# Patient Record
Sex: FEMALE | URBAN - METROPOLITAN AREA
[De-identification: names, ages, dates, MRNs, and addresses within clinical notes are randomized per-mention and may not be internally consistent; named-entity substitution may affect disease eponyms.]

---

## 2017-02-16 ENCOUNTER — AMBULATORY - HEALTHEAST (OUTPATIENT)
Dept: NEUROLOGY | Facility: CLINIC | Age: 52
End: 2017-02-16

## 2017-02-16 DIAGNOSIS — I67.1 BRAIN ANEURYSM: ICD-10-CM

## 2017-04-12 ENCOUNTER — HOSPITAL ENCOUNTER (OUTPATIENT)
Dept: NEUROLOGY | Facility: CLINIC | Age: 52
Setting detail: THERAPIES SERIES
Discharge: STILL ADMITTED OR ADMINISTRATIVE | End: 2017-04-12
Attending: SOCIAL WORKER

## 2017-04-12 ENCOUNTER — HOSPITAL ENCOUNTER (OUTPATIENT)
Dept: NEUROLOGY | Facility: CLINIC | Age: 52
Setting detail: THERAPIES SERIES
Discharge: STILL ADMITTED OR ADMINISTRATIVE | End: 2017-04-12
Attending: NURSE PRACTITIONER

## 2017-04-12 DIAGNOSIS — F09 COGNITIVE DISORDER: ICD-10-CM

## 2017-04-12 ASSESSMENT — MIFFLIN-ST. JEOR: SCORE: 1171.48

## 2017-12-03 ENCOUNTER — HEALTH MAINTENANCE LETTER (OUTPATIENT)
Age: 52
End: 2017-12-03

## 2018-02-05 ENCOUNTER — RECORDS - HEALTHEAST (OUTPATIENT)
Dept: LAB | Facility: CLINIC | Age: 53
End: 2018-02-05

## 2018-02-05 LAB — VIT B12 SERPL-MCNC: 421 PG/ML (ref 213–816)

## 2018-06-04 ENCOUNTER — RECORDS - HEALTHEAST (OUTPATIENT)
Dept: LAB | Facility: CLINIC | Age: 53
End: 2018-06-04

## 2018-06-05 LAB
ANION GAP SERPL CALCULATED.3IONS-SCNC: 10 MMOL/L (ref 5–18)
BASOPHILS # BLD AUTO: 0 THOU/UL (ref 0–0.2)
BASOPHILS NFR BLD AUTO: 0 % (ref 0–2)
BUN SERPL-MCNC: 13 MG/DL (ref 8–22)
CALCIUM SERPL-MCNC: 9.4 MG/DL (ref 8.5–10.5)
CHLORIDE BLD-SCNC: 105 MMOL/L (ref 98–107)
CO2 SERPL-SCNC: 24 MMOL/L (ref 22–31)
CREAT SERPL-MCNC: 0.77 MG/DL (ref 0.6–1.1)
EOSINOPHIL # BLD AUTO: 0.1 THOU/UL (ref 0–0.4)
EOSINOPHIL NFR BLD AUTO: 2 % (ref 0–6)
ERYTHROCYTE [DISTWIDTH] IN BLOOD BY AUTOMATED COUNT: 14.5 % (ref 11–14.5)
GFR SERPL CREATININE-BSD FRML MDRD: >60 ML/MIN/1.73M2
GLUCOSE BLD-MCNC: 96 MG/DL (ref 70–125)
HCT VFR BLD AUTO: 39 % (ref 35–47)
HGB BLD-MCNC: 12.9 G/DL (ref 12–16)
LYMPHOCYTES # BLD AUTO: 1.2 THOU/UL (ref 0.8–4.4)
LYMPHOCYTES NFR BLD AUTO: 26 % (ref 20–40)
MCH RBC QN AUTO: 29.9 PG (ref 27–34)
MCHC RBC AUTO-ENTMCNC: 33.1 G/DL (ref 32–36)
MCV RBC AUTO: 91 FL (ref 80–100)
MONOCYTES # BLD AUTO: 0.5 THOU/UL (ref 0–0.9)
MONOCYTES NFR BLD AUTO: 11 % (ref 2–10)
NEUTROPHILS # BLD AUTO: 2.7 THOU/UL (ref 2–7.7)
NEUTROPHILS NFR BLD AUTO: 60 % (ref 50–70)
PLATELET # BLD AUTO: 196 THOU/UL (ref 140–440)
PMV BLD AUTO: 10.4 FL (ref 8.5–12.5)
POTASSIUM BLD-SCNC: 4.3 MMOL/L (ref 3.5–5)
RBC # BLD AUTO: 4.31 MILL/UL (ref 3.8–5.4)
SODIUM SERPL-SCNC: 139 MMOL/L (ref 136–145)
WBC: 4.5 THOU/UL (ref 4–11)

## 2018-07-12 ENCOUNTER — TRANSFERRED RECORDS (OUTPATIENT)
Dept: HEALTH INFORMATION MANAGEMENT | Facility: CLINIC | Age: 53
End: 2018-07-12

## 2018-07-18 ENCOUNTER — OFFICE VISIT (OUTPATIENT)
Dept: OPHTHALMOLOGY | Facility: CLINIC | Age: 53
End: 2018-07-18
Payer: COMMERCIAL

## 2018-07-18 DIAGNOSIS — I60.7 CEREBRAL ANEURYSM RUPTURE (H): ICD-10-CM

## 2018-07-18 DIAGNOSIS — H53.10 SUBJECTIVE VISUAL DISTURBANCE: Primary | ICD-10-CM

## 2018-07-18 DIAGNOSIS — H52.4 PRESBYOPIA: ICD-10-CM

## 2018-07-18 RX ORDER — ASPIRIN 81 MG/1
81 TABLET, CHEWABLE ORAL
COMMUNITY
Start: 2018-01-25

## 2018-07-18 RX ORDER — QUETIAPINE FUMARATE 25 MG/1
50 TABLET, FILM COATED ORAL
COMMUNITY
Start: 2018-01-25

## 2018-07-18 RX ORDER — CYCLOBENZAPRINE HCL 5 MG
5 TABLET ORAL
COMMUNITY
Start: 2018-01-25

## 2018-07-18 RX ORDER — TAMOXIFEN CITRATE 20 MG/1
20 TABLET ORAL
COMMUNITY
Start: 2018-01-25

## 2018-07-18 RX ORDER — VENLAFAXINE 75 MG/1
75 TABLET ORAL
COMMUNITY
Start: 2018-01-25

## 2018-07-18 RX ORDER — VENLAFAXINE HYDROCHLORIDE 150 MG/1
150 CAPSULE, EXTENDED RELEASE ORAL
COMMUNITY
Start: 2018-01-25

## 2018-07-18 RX ORDER — ONDANSETRON 4 MG/1
4 TABLET, FILM COATED ORAL
COMMUNITY
Start: 2018-01-25

## 2018-07-18 RX ORDER — HYDROXYZINE HYDROCHLORIDE 25 MG/1
100 TABLET, FILM COATED ORAL
COMMUNITY
Start: 2018-01-25

## 2018-07-18 ASSESSMENT — EXTERNAL EXAM - RIGHT EYE: OD_EXAM: NORMAL

## 2018-07-18 ASSESSMENT — CONF VISUAL FIELD
OD_NORMAL: 1
METHOD: COUNTING FINGERS
OS_NORMAL: 1

## 2018-07-18 ASSESSMENT — CUP TO DISC RATIO
OS_RATIO: 0.3
OD_RATIO: 0.3

## 2018-07-18 ASSESSMENT — TONOMETRY
OD_IOP_MMHG: 12
OS_IOP_MMHG: 12
IOP_METHOD: ICARE

## 2018-07-18 ASSESSMENT — SLIT LAMP EXAM - LIDS
COMMENTS: NORMAL
COMMENTS: NORMAL

## 2018-07-18 ASSESSMENT — VISUAL ACUITY
OS_SC: 20/20
OS_SC+: -2
OD_SC: 20/20
METHOD: SNELLEN - LINEAR

## 2018-07-18 ASSESSMENT — EXTERNAL EXAM - LEFT EYE: OS_EXAM: NORMAL

## 2018-07-18 NOTE — PROGRESS NOTES
Assessment/Plan  (H53.10) Subjective visual disturbance  (primary encounter diagnosis)  Comment: Some difficulty with eye tracking, processing delay between eyes and brain  Plan: Discussed findings with patient. Ocular health today appears WNL. Symptoms appear to be consistent with cognitive and/or vestibular impairment. Encouraged patient to continue working with PT on gaze-stability training. Increasing difficulty in these exercises should continue improving patient's ability to function in busy environments. Advised patient to RTC with any changes in her vision- including new blur, double vision, or difficulty with focusing.    (I60.9) Cerebral aneurysm rupture (H)  Plan: Continue following recommendations and follow up schedule of PCP.     (H52.4) Presbyopia  Plan: Continue with OTC reading glasses. No prescription glasses warranted at this time.       Complete documentation of historical and exam elements from today's encounter can  be found in the full encounter summary report (not reduplicated in this progress  note). I personally obtained the chief complaint(s) and history of present illness. I  confirmed and edited as necessary the review of systems, past medical/surgical  history, family history, social history, and examination findings as documented by  others; and I examined the patient myself. I personally reviewed the relevant tests,  images, and reports as documented above. I formulated and edited as necessary the  assessment and plan and discussed the findings and management plan with the  patient and family.    Jeff Ruiz OD

## 2018-07-18 NOTE — MR AVS SNAPSHOT
After Visit Summary   7/18/2018    Zeynep Martin    MRN: [id number]           Patient Information     Date Of Birth          1965        Visit Information        Provider Department      7/18/2018 12:00 PM Jeff Ruiz, [facility]        Today's Diagnoses     Subjective visual disturbance    -  1    Cerebral aneurysm rupture (H)        Presbyopia           Follow-ups after your visit        Follow-up notes from your care team     Return if symptoms worsen or fail to improve, for Follow Up.      Who to contact     Please call your clinic at [phone] to:    Ask questions about your health    Make or cancel appointments    Discuss your medicines    Learn about your test results    Speak to your doctor            Additional Information About Your Visit        [employer] Information     [employer] gives you secure access to your electronic health record. If you see a primary care provider, you can also send messages to your care team and make appointments. If you have questions, please call your primary care clinic.  If you do not have a primary care provider, please call [phone] and they will assist you.      [employer] is an electronic gateway that provides easy, online access to your medical records. With [employer], you can request a clinic appointment, read your test results, renew a prescription or communicate with your care team.     To access your existing account, please contact your [facility] or call [phone] for assistance.        Care EveryWhere ID     This is your Care EveryWhere ID. This could be used by other organizations to access your [place] medical records  [id number]         Blood Pressure from Last 3 Encounters:   04/08/15 99/64   07/25/13 99/50    Weight from Last 3 Encounters:   04/08/15 55.3 kg (121 lb 14.4 oz)   07/25/13 55.6 kg (122 lb 8 oz)              Today, you had the following     No orders found for display        Primary Care Provider Office Phone # Fax #    Kacy Cruz PA-C [phone]       [facility] [address] [place] [address]        Equal Access to Services     RAFITA WALKER : Hadii aad ku hadshailesho Soernestoali, waaxda luqadaha, qaybta kaalmada adeegyada, haja sorto ravi rosen. So [facility] [phone].    ATENCIÓN: Si habla español, tiene a fontana disposición servicios gratuitos de asistencia lingüística. Llame al [phone].    We comply with applicable federal civil rights laws and [place] laws. We do not discriminate on the basis of race, color, national origin, age, disability, sex, sexual orientation, or gender identity.            Thank you!     Thank you for choosing [facility]  for your care. Our goal is always to provide you with excellent care. Hearing back from our patients is one way we can continue to improve our services. Please take a few minutes to complete the written survey that you may receive in the mail after your visit with us. Thank you!             Your Updated Medication List - Protect others around you: Learn how to safely use, store and throw away your medicines at www.disposemymeds.org.          This list is accurate as of 7/18/18  1:32 PM.  Always use your most recent med list.                   Brand Name Dispense Instructions for use Diagnosis    acetaminophen 500 MG tablet    TYLENOL     Take 500-1,000 mg by mouth every 6 hours as needed. Take as needed        aspirin 81 MG chewable tablet      Take 81 mg by mouth        Biotin 5000 MCG Caps      Take by mouth daily        celeXA 20 MG tablet   Generic drug:  citalopram      Take 20 mg by mouth daily.        [facility] DIGESTIVE HEALTH Caps      Take by mouth daily        cyclobenzaprine 5 MG tablet    FLEXERIL     Take 5 mg by mouth        ESCITALOPRAM OXALATE PO      Take 10 mg by mouth daily        hydrOXYzine 25 MG tablet    ATARAX     Take 100 mg by mouth        MOTRIN  PO      Take  by mouth 3 times daily. 1 -800 mg tab every 8 hours        ondansetron 4 MG tablet    ZOFRAN     Take 4 mg by mouth        QUEtiapine 25 MG tablet    SEROquel     Take 50 mg by mouth        * TAMOXIFEN CITRATE PO      Take 20 mg by mouth daily        * tamoxifen 20 MG tablet    NOLVADEX     Take 20 mg by mouth        VANCOMYCIN HCL PO      Take 125 mg by mouth 4 times daily        * venlafaxine 150 MG 24 hr capsule    EFFEXOR-XR     Take 150 mg by mouth        * venlafaxine 75 MG tablet    EFFEXOR     Take 75 mg by mouth        * Notice:  This list has 4 medication(s) that are the same as other medications prescribed for you. Read the directions carefully, and ask your doctor or other care provider to review them with you.

## 2018-07-18 NOTE — NURSING NOTE
"Chief Complaints and History of Present Illnesses   Patient presents with     Consult For     vision issues after stroke     HPI    Affected eye(s):  Both   Symptoms:     No blurred vision   Floaters   No flashes   Photophobia         Do you have eye pain now?:  No      Comments:    Patient notes she had a ruptured brain aneurysm November 28, 2016; the month later had a stroke.  Has been having issues with visions, having troubles with \"eyes keeping up with what I am seeing, I can do it, it just has to be slow motions\"    Patient has HA daily, has \"real bad ones\" about 3-4 x weekly, sometimes they can go on for days, takes tylenol, little to no relief, photophobic, nausea, and dizziness, having depth perception issues.     Iliana Patterson July 18, 2018 12:03 PM               "

## 2018-11-20 ENCOUNTER — HOME CARE/HOSPICE - HEALTHEAST (OUTPATIENT)
Dept: HOME HEALTH SERVICES | Facility: HOME HEALTH | Age: 53
End: 2018-11-20

## 2018-11-27 ENCOUNTER — HOME CARE/HOSPICE - HEALTHEAST (OUTPATIENT)
Dept: HOME HEALTH SERVICES | Facility: HOME HEALTH | Age: 53
End: 2018-11-27

## 2018-12-01 ENCOUNTER — HOME CARE/HOSPICE - HEALTHEAST (OUTPATIENT)
Dept: HOME HEALTH SERVICES | Facility: HOME HEALTH | Age: 53
End: 2018-12-01

## 2019-09-11 ENCOUNTER — RECORDS - HEALTHEAST (OUTPATIENT)
Dept: LAB | Facility: CLINIC | Age: 54
End: 2019-09-11

## 2019-09-12 LAB — BACTERIA SPEC CULT: NO GROWTH

## 2019-10-14 ENCOUNTER — RECORDS - HEALTHEAST (OUTPATIENT)
Dept: LAB | Facility: CLINIC | Age: 54
End: 2019-10-14

## 2019-10-14 LAB — FERRITIN SERPL-MCNC: 42 NG/ML (ref 10–130)

## 2021-05-25 ENCOUNTER — RECORDS - HEALTHEAST (OUTPATIENT)
Dept: ADMINISTRATIVE | Facility: CLINIC | Age: 56
End: 2021-05-25

## 2021-05-26 ENCOUNTER — RECORDS - HEALTHEAST (OUTPATIENT)
Dept: ADMINISTRATIVE | Facility: CLINIC | Age: 56
End: 2021-05-26

## 2021-05-27 ENCOUNTER — RECORDS - HEALTHEAST (OUTPATIENT)
Dept: ADMINISTRATIVE | Facility: CLINIC | Age: 56
End: 2021-05-27

## 2021-05-28 ENCOUNTER — RECORDS - HEALTHEAST (OUTPATIENT)
Dept: ADMINISTRATIVE | Facility: CLINIC | Age: 56
End: 2021-05-28

## 2021-05-29 ENCOUNTER — RECORDS - HEALTHEAST (OUTPATIENT)
Dept: ADMINISTRATIVE | Facility: CLINIC | Age: 56
End: 2021-05-29

## 2021-05-30 ENCOUNTER — RECORDS - HEALTHEAST (OUTPATIENT)
Dept: ADMINISTRATIVE | Facility: CLINIC | Age: 56
End: 2021-05-30

## 2021-05-30 VITALS — HEIGHT: 65 IN | WEIGHT: 128 LBS | BODY MASS INDEX: 21.33 KG/M2

## 2021-05-31 ENCOUNTER — RECORDS - HEALTHEAST (OUTPATIENT)
Dept: ADMINISTRATIVE | Facility: CLINIC | Age: 56
End: 2021-05-31

## 2021-06-01 ENCOUNTER — RECORDS - HEALTHEAST (OUTPATIENT)
Dept: ADMINISTRATIVE | Facility: CLINIC | Age: 56
End: 2021-06-01

## 2021-06-02 ENCOUNTER — RECORDS - HEALTHEAST (OUTPATIENT)
Dept: ADMINISTRATIVE | Facility: CLINIC | Age: 56
End: 2021-06-02

## 2021-06-10 NOTE — PROGRESS NOTES
OUT PATIENT CLINICAL SOCIAL WORK: PSYCHOSOCIAL ASSESSMENT      Zeynep Martin   1965 4/12/2017    Primary Language: English  Referral Source: Kacy Cruz PA-C  Person(s) Present at Visit: Patient presents to outpatient services accompanied by her sister.   Goal(s) for Visit: First Consultation  Presenting Concerns: Zeynep Martin is a 51 year old female with cognitive issues, visual symptoms, balance issues, headaches, anxiety, and depression. Patient presents to outpatient services for an initial appointment with Cherelle Mclean. Zeynep reported having a brain aneurysm in 12/2016. She wants to find out what's going on. Per patient, she has many concerns such as headaches (daily), cognitive concerns (examples figuring out how to complete tasks and watching tv), dizziness, concerns with basic math,  and forgetfulness (examples forgetting her anniversary & forgetting her garage door code).  Per family, she is concerned about her memory. Patient reported that having a calender to write down appointments and having the garage code written on her hand has helped some. Per family, patient has multiple notes about upcoming appointments or information and sometimes misplaces that information .     PRIMARY DECISION MAKER FOR HEALTHCARE  Patient  Copy of legal documents on chart? NA    Additional Information: None     PATIENT REPRESENTATIVE  Same as above    Additional Contacts: None     Primary Decision Maker for Healthcare provides verbal authorization for this clinic to have care coordination conversations with the above contacts as needed: No     HEALTHCARE DIRECTIVE/LEGAL ISSUES  Pt has healthcare directive:No     Name: N/A    Contact information: N/A    If yes, copy of documents on chart? NA    Additional Information: None     FINANCIAL INFORMATION  Primary Funding Source: [employer] Secondary Funding Source: None     Additional Financial Information: None     Financial POA: No    SSI / SSDI: No     Social  Security: No    : No    VA Benefits: Branch  N/A                      How long: N/A    LTC Insurance: no    Medical Assistance (MA) Status:     N/A    County of Residence: [facility])    Waiver Services: No  type:N/A      OCCUPATION / EDUCATION:  Current Employment: Unemployed  Prior Occupation(s): unknown     BELIEF SYSTEM: unknown     MEDICAL:  Please see  04/12/2017 consultation from Cherelle Mclean for complete medical history.  Community MD/Clinic: Kacy Cruz at [facility]    Additional Information/Concerns: None     CHEMICAL HEALTH:  Current Tobacco Use: None  Prior Tobacco Use: None       Current Alcohol/Drug Use: None        Treatment: None  Prior Alcohol/Drug Use: None       Treatment: None  Additional Information/Concerns:  None    PSYCHIATRIC / BEHAVIORAL:  Current Dx: Dx/How long: Per medical chart patient was diagnosed with depression.  Current Treatment: Medication  Prior Dx: Dx/How long: Per medical chart patient was diagnosed with depression.  Prior treatment: Medication  Additional Information/Concerns: None     HOME / LIVING ENVIRONMENT:  Patient lives: With SO/Family  Home Accessibility Concerns: None  Additional Information/Concerns: None     COMMUNITY / SOCIAL SUPPORT:   Community services: None    Additional Information/Concerns: None     FAMILY SUPPORT:  Relationship Status:   Children: unknown  Additional Information/Concerns: None     DAILY ROUTINE:  Sleep- Patient reported having trouble sleeping at night. She wakes up during the night (a couple of times a week) experiencing anxiety and has night sweats. Per patient she gets out of bed and goes to watch tv in the living room.    Nutrition- unkown  Hobbies- unkown  Additional Information/Concerns: None     FUNCTIONAL STATUS:  Is patient driving?unknown  Additional Information: None   Is patient independent with the following activities? Bathing, Dressing, Grooming, Toileting, Eating, Mobility,  Home Making, Medication Management, Meal Preparation, Shopping and Financial Management  Additional Information/Concerns: None       PRIOR COGNITIVE TESTING / IMAGING: unknown    INTERVENTION(S):  Assessment (such as the Banegas Anxiety Inventory- patient scored 35 indicating moderate anxiety, Banegas Depression Inventory- patient scored 57 indicating severe depression, and MOCA- 18/30)  Additional Information: None     PATIENT/FAMILY OBSERVATIONS:  Patient: Patient is well-groomed. Patient is cooperative and willing to answer any questions that the writer has for her. Patient is depressed and anxious. Patient's affect is flat and at times sad/tearful. Patient's speech is normal.   Family/Caregiver: Patient;'s sister is kind and friendly. She has insight into her sisters condition and is concerned about her sister. She is cooperative and willing to answer any questions that the writer has for her.     PLAN/FOLLOW-UP: Social Work will follow-up with patient and/or family as requested.    Ibeth Renteria, Social Work Intern  04/12/2017  1:55 PM

## 2021-06-10 NOTE — PROGRESS NOTES
.  Assessment / Impression     At his post subarachnoid hemorrhage from aneurysmal rupture with coiling.  Status post small ischemic stroke  1.  Cognitive issues; persistent.  2.  Visual symptoms; persistent.  3.  Balance issues; persistent.  4.  Headache; persistent.  Anxiety and depression; exacerbated.  Insomnia.    Plan:     Patient does have neuropsych testing scheduled 5/17.  I encouraged her not to miss that appointment and request records be sent to our office.  OT eval and treat, cognitive and visual assessment.  PT eval and treat.  We will have  contact patient regarding financial issues.  We will keep follow-up open at this time, I would like to see her after she is completed neuropsych and results are available.  I did give her the nurse triage line if there is any questions or concerns that come up before that time.    Subjective:      HPI: Zeynep Martin is a 51 y.o. female with cognitive and mood issues secondary to a aneurysmal rupture and ischemic stroke.  Patient presented to [facility] on 12/20/16 with right-sided numbness and ongoing headaches.  She had a subarachnoid hemorrhage from  aneurysmal rupture.  She required coiling and did have vasospasm.  Patient was discharged and then a week later diagnosed with a small ischemic stroke.  She was started on baby aspirin.  Patient did follow-up with neurology for continued cognitive issues, balance problems and headache.  She was scheduled for neuropsych testing, the soonest she could get in was May 2017.  Her primary care provider referred her to this clinic to see if she could get testing and possible therapy sooner.  Patient's main concern is her cognitive status.  Her short-term memory is very poor.  She has to write things down and keeps notes and then often forgets where she keeps the notes.  During the day she will have a cup of coffee, microwave it several times because it has cooled up before she can find the cup again  "and drink it.  She feels she \"cannot stay on track\".  She does manage her own medications, takes them all in the morning with her breakfast so she can remember.  She is not driving.  Patient is not normally a reader but she is having a terrible time with paperwork, she finds it overwhelming and can understand the questions being asked.  She enjoys TV but cannot follow programs or movies.  She often forgets appointments, she will make a list for the grocery store and then forget items.  Speech is hesitant at times and has her sister describes \"interrupted\".  Her balance is off, she feels dizzy at times and at one point she fell and hit the side of her head on the left on the wall.  She did get a bruise, no loss of consciousness.  Patient states she has to be careful when she showers, leaning up against the wall while she is shampooing her head.  She has persistent headaches and sleep disturbance despite the treatments given to her by the neurologist.  She also has intermittent tingling on the right side, no report of a neck injury or pain.  She describes her vision is blurry especially in the left eye.  Some of her symptoms sound like a migrainous aura.  No double vision and she had no visual problems or acquired corrective lenses before her stroke.    PSYCH/CD HX: No prior history of psychiatric or chemical dependency admissions.  Patient did undergo breast cancer treatment in 2013, she states with the chemotherapy she had \"chemo brain\" as well as developed depression anxiety.  Her sister who is present states \"that is where everything started\".  Patient is a non-smoker, occasional alcohol use.  She also states she suffers from restless leg syndrome.    FAMILY/SOCIAL HX: Patient is , currently unemployed.  Patient graduated from high school.  Prior to her stroke, she worked in a front office of of a company.  They had to let her go because she could not do her job.  Patient is on her 's health " insurance but there is a lot of co-pays and deductibles.  She also has no income and so announces her big concern.  Her  also has health issues.  She states he just got to the hospital with pneumonia and has cardiomyopathy.        Past Medical History:   Diagnosis Date     Brain aneurysm 12/2016     Breast cancer      Breast cancer      Depression      Pneumonia      Stroke      Past Surgical History:   Procedure Laterality Date     BREAST SURGERY       CHOLECYSTECTOMY       HYSTERECTOMY       Review of patient's allergies indicates no known allergies.  Current Outpatient Prescriptions   Medication Sig Dispense Refill     escitalopram oxalate (LEXAPRO) 10 MG tablet Take 10 mg by mouth daily.       tamoxifen (NOLVADEX) 20 MG tablet Take 20 mg by mouth daily.       No current facility-administered medications for this encounter.      Family History   Problem Relation Age of Onset     Breast cancer       Prostate cancer       Breast cancer Mother      Prostate cancer Father      Social History     Social History     Marital status:      Spouse name: N/A     Number of children: N/A     Years of education: N/A     Social History Main Topics     Smoking status: Never Smoker     Smokeless tobacco: Not on file     Alcohol use Yes      Comment: occasional     Drug use: Not on file     Sexual activity: Not on file     Other Topics Concern     Not on file     Social History Narrative     Patient Active Problem List   Diagnosis     Menorrhagia     Pneumonia     Breast cancer     Depression     Cognitive disorder       Review of Systems  Constitutional: negative  Eyes: positive for visual disturbance  Ears, nose, mouth, throat, and face: negative for hearing loss and tinnitus  Musculoskeletal:positive for back pain and neck pain  Neurological: positive for dizziness, gait problems, headaches, memory problems, paresthesia and speech problems, negative for weakness  Behavioral/Psych: positive for anxiety, depression,  "fatigue, learning difficulty, loss of interest in favorite activities and sleep disturbance       Objective:     Vitals:    04/12/17 1059   BP: (!) 131/93   Pulse: 70   Weight: 128 lb (58.1 kg)   Height: 5' 5\" (1.651 m)       Physical Exam: General appearance: alert, appears stated age, cooperative and moderate distress.  Patient arrives to the appointment on time accompanied by her sister.  Head: Normocephalic, without obvious abnormality, atraumatic  Neck: Normal range of motion without pain or stiffness.  Neurologic: Patient is alert and oriented ×3. MOCA-8/30.  Patient had difficulty focusing on exam.  Speech is clear, somewhat hesitant at times. Thought content devoid of any delusions, suicidal, homicidal or obsesional content. Patient is quite tearful and anxious when discussing situation.  She will often defer to her sister for some of her history or refer to her calendar and notes.  She will often stop and asked that the question be repeated.  She is able to follow one-step verbal commands without difficulty.  She did have difficulty with complex instructions for visual testing.  Pupils are equal and react to light both direct and consensual and accommodation.  EOMs appear grossly intact as do visual fields.  Some delay with saccade and smooth pursuit.  She had difficulty with VOR testing.  No drift, no facial asymmetry.  Reflexes symmetrical, toes downgoing.  Sensation intact to double simultaneous stimulation.  She did have sway with Romberg, difficulty with tandem walk.  She is able to toe and heel walk and balance briefly on one foot.    Back anxiety: 35 indicating moderate anxiety  Banegas depression scale: 57, indicating severe  "

## 2021-06-15 PROBLEM — F09 COGNITIVE DISORDER: Status: ACTIVE | Noted: 2017-04-12

## 2021-07-03 NOTE — ADDENDUM NOTE
Addendum Note by Winston Jeff MA at 4/12/2017  3:27 PM     Author: Winston Jeff MA Service: -- Author Type: Medical Assistant    Filed: 4/12/2017  3:27 PM Date of Service: 4/12/2017  3:27 PM Status: Signed    : Winston Jeff MA (Medical Assistant)    Encounter addended by: Winston Jeff MA on: 4/12/2017  3:27 PM<BR>     Actions taken: Charge Capture section accepted

## 2021-07-08 ENCOUNTER — APPOINTMENT (OUTPATIENT)
Age: 56
Setting detail: DERMATOLOGY
End: 2021-07-08

## 2021-07-08 DIAGNOSIS — L70.0 ACNE VULGARIS: ICD-10-CM

## 2021-07-08 DIAGNOSIS — L72.8 OTHER FOLLICULAR CYSTS OF THE SKIN AND SUBCUTANEOUS TISSUE: ICD-10-CM

## 2021-07-08 PROCEDURE — 99204 OFFICE O/P NEW MOD 45 MIN: CPT | Mod: 25

## 2021-07-08 PROCEDURE — OTHER INTRALESIONAL KENALOG: OTHER

## 2021-07-08 PROCEDURE — 11900 INJECT SKIN LESIONS </W 7: CPT

## 2021-07-08 PROCEDURE — OTHER COUNSELING: OTHER

## 2021-07-08 PROCEDURE — OTHER MIPS QUALITY: OTHER

## 2021-07-08 PROCEDURE — OTHER PRESCRIPTION: OTHER

## 2021-07-08 RX ORDER — DOXYCYCLINE HYCLATE 100 MG/1
CAPSULE, GELATIN COATED ORAL
Qty: 60 | Refills: 2 | Status: ERX | COMMUNITY
Start: 2021-07-08

## 2021-07-08 RX ORDER — CLINDAMYCIN PHOSPHATE 10 MG/ML
LOTION TOPICAL
Qty: 1 | Refills: 2 | Status: ERX | COMMUNITY
Start: 2021-07-08

## 2021-07-08 ASSESSMENT — LOCATION ZONE DERM
LOCATION ZONE: FACE
LOCATION ZONE: NOSE

## 2021-07-08 ASSESSMENT — LOCATION DETAILED DESCRIPTION DERM
LOCATION DETAILED: LEFT CENTRAL BUCCAL CHEEK
LOCATION DETAILED: RIGHT SUPERIOR NASAL CHEEK
LOCATION DETAILED: NASAL ROOT

## 2021-07-08 ASSESSMENT — LOCATION SIMPLE DESCRIPTION DERM
LOCATION SIMPLE: NOSE
LOCATION SIMPLE: LEFT CHEEK
LOCATION SIMPLE: RIGHT CHEEK

## 2021-07-08 NOTE — PROCEDURE: INTRALESIONAL KENALOG
Concentration Of Solution Injected (Mg/Ml): 2.5
Detail Level: Detailed
Total Volume Injected (Ccs- Only Use Numbers And Decimals): 0.1
X Size Of Lesion In Cm (Optional): 0
Consent: The risks of atrophy were reviewed with the patient.
Validate Note Data When Using Inventory: Yes
Medical Necessity Clause: This procedure was medically necessary because the lesions that were treated were:
Kenalog Preparation: Kenalog
Include Z78.9 (Other Specified Conditions Influencing Health Status) As An Associated Diagnosis?: No

## 2021-07-08 NOTE — PROCEDURE: COUNSELING
Birth Control Pills Counseling: Birth Control Pill Counseling: I discussed with the patient the potential side effects of OCPs including but not limited to increased risk of stroke, heart attack, thrombophlebitis, deep venous thrombosis, hepatic adenomas, breast changes, GI upset, headaches, and depression.  The patient verbalized understanding of the proper use and possible adverse effects of OCPs. All of the patient's questions and concerns were addressed.
Dapsone Pregnancy And Lactation Text: This medication is Pregnancy Category C and is not considered safe during pregnancy or breast feeding.
Erythromycin Pregnancy And Lactation Text: This medication is Pregnancy Category B and is considered safe during pregnancy. It is also excreted in breast milk.
Doxycycline Pregnancy And Lactation Text: This medication is Pregnancy Category D and not consider safe during pregnancy. It is also excreted in breast milk but is considered safe for shorter treatment courses.
Doxycycline Counseling:  Patient counseled regarding possible photosensitivity and increased risk for sunburn.  Patient instructed to avoid sunlight, if possible.  When exposed to sunlight, patients should wear protective clothing, sunglasses, and sunscreen.  The patient was instructed to call the office immediately if the following severe adverse effects occur:  hearing changes, easy bruising/bleeding, severe headache, or vision changes.  The patient verbalized understanding of the proper use and possible adverse effects of doxycycline.  All of the patient's questions and concerns were addressed.
Topical Clindamycin Counseling: Patient counseled that this medication may cause skin irritation or allergic reactions.  In the event of skin irritation, the patient was advised to reduce the amount of the drug applied or use it less frequently.   The patient verbalized understanding of the proper use and possible adverse effects of clindamycin.  All of the patient's questions and concerns were addressed.
Bactrim Counseling:  I discussed with the patient the risks of sulfa antibiotics including but not limited to GI upset, allergic reaction, drug rash, diarrhea, dizziness, photosensitivity, and yeast infections.  Rarely, more serious reactions can occur including but not limited to aplastic anemia, agranulocytosis, methemoglobinemia, blood dyscrasias, liver or kidney failure, lung infiltrates or desquamative/blistering drug rashes.
Topical Retinoid Pregnancy And Lactation Text: This medication is Pregnancy Category C. It is unknown if this medication is excreted in breast milk.
Include Pregnancy/Lactation Warning?: No
High Dose Vitamin A Counseling: Side effects reviewed, pt to contact office should one occur.
Minocycline Counseling: Patient advised regarding possible photosensitivity and discoloration of the teeth, skin, lips, tongue and gums.  Patient instructed to avoid sunlight, if possible.  When exposed to sunlight, patients should wear protective clothing, sunglasses, and sunscreen.  The patient was instructed to call the office immediately if the following severe adverse effects occur:  hearing changes, easy bruising/bleeding, severe headache, or vision changes.  The patient verbalized understanding of the proper use and possible adverse effects of minocycline.  All of the patient's questions and concerns were addressed.
Tetracycline Counseling: Patient counseled regarding possible photosensitivity and increased risk for sunburn.  Patient instructed to avoid sunlight, if possible.  When exposed to sunlight, patients should wear protective clothing, sunglasses, and sunscreen.  The patient was instructed to call the office immediately if the following severe adverse effects occur:  hearing changes, easy bruising/bleeding, severe headache, or vision changes.  The patient verbalized understanding of the proper use and possible adverse effects of tetracycline.  All of the patient's questions and concerns were addressed. Patient understands to avoid pregnancy while on therapy due to potential birth defects.
Topical Retinoid counseling:  Patient advised to apply a pea-sized amount only at bedtime and wait 30 minutes after washing their face before applying.  If too drying, patient may add a non-comedogenic moisturizer. The patient verbalized understanding of the proper use and possible adverse effects of retinoids.  All of the patient's questions and concerns were addressed.
Minocycline Pregnancy And Lactation Text: This medication is Pregnancy Category D and not consider safe during pregnancy. It is also excreted in breast milk.
Sarecycline Counseling: Patient advised regarding possible photosensitivity and discoloration of the teeth, skin, lips, tongue and gums.  Patient instructed to avoid sunlight, if possible.  When exposed to sunlight, patients should wear protective clothing, sunglasses, and sunscreen.  The patient was instructed to call the office immediately if the following severe adverse effects occur:  hearing changes, easy bruising/bleeding, severe headache, or vision changes.  The patient verbalized understanding of the proper use and possible adverse effects of sarecycline.  All of the patient's questions and concerns were addressed.
Tazorac Pregnancy And Lactation Text: This medication is not safe during pregnancy. It is unknown if this medication is excreted in breast milk.
Isotretinoin Counseling: Patient should get monthly blood tests, not donate blood, not drive at night if vision affected, not share medication, and not undergo elective surgery for 6 months after tx completed. Side effects reviewed, pt to contact office should one occur.
Spironolactone Pregnancy And Lactation Text: This medication can cause feminization of the male fetus and should be avoided during pregnancy. The active metabolite is also found in breast milk.
Dapsone Counseling: I discussed with the patient the risks of dapsone including but not limited to hemolytic anemia, agranulocytosis, rashes, methemoglobinemia, kidney failure, peripheral neuropathy, headaches, GI upset, and liver toxicity.  Patients who start dapsone require monitoring including baseline LFTs and weekly CBCs for the first month, then every month thereafter.  The patient verbalized understanding of the proper use and possible adverse effects of dapsone.  All of the patient's questions and concerns were addressed.
Topical Sulfur Applications Counseling: Topical Sulfur Counseling: Patient counseled that this medication may cause skin irritation or allergic reactions.  In the event of skin irritation, the patient was advised to reduce the amount of the drug applied or use it less frequently.   The patient verbalized understanding of the proper use and possible adverse effects of topical sulfur application.  All of the patient's questions and concerns were addressed.
Tazorac Counseling:  Patient advised that medication is irritating and drying.  Patient may need to apply sparingly and wash off after an hour before eventually leaving it on overnight.  The patient verbalized understanding of the proper use and possible adverse effects of tazorac.  All of the patient's questions and concerns were addressed.
Benzoyl Peroxide Counseling: Patient counseled that medicine may cause skin irritation and bleach clothing.  In the event of skin irritation, the patient was advised to reduce the amount of the drug applied or use it less frequently.   The patient verbalized understanding of the proper use and possible adverse effects of benzoyl peroxide.  All of the patient's questions and concerns were addressed.
Bactrim Pregnancy And Lactation Text: This medication is Pregnancy Category D and is known to cause fetal risk.  It is also excreted in breast milk.
Isotretinoin Pregnancy And Lactation Text: This medication is Pregnancy Category X and is considered extremely dangerous during pregnancy. It is unknown if it is excreted in breast milk.
High Dose Vitamin A Pregnancy And Lactation Text: High dose vitamin A therapy is contraindicated during pregnancy and breast feeding.
Topical Sulfur Applications Pregnancy And Lactation Text: This medication is Pregnancy Category C and has an unknown safety profile during pregnancy. It is unknown if this topical medication is excreted in breast milk.
Benzoyl Peroxide Pregnancy And Lactation Text: This medication is Pregnancy Category C. It is unknown if benzoyl peroxide is excreted in breast milk.
Birth Control Pills Pregnancy And Lactation Text: This medication should be avoided if pregnant and for the first 30 days post-partum.
Erythromycin Counseling:  I discussed with the patient the risks of erythromycin including but not limited to GI upset, allergic reaction, drug rash, diarrhea, increase in liver enzymes, and yeast infections.
Topical Clindamycin Pregnancy And Lactation Text: This medication is Pregnancy Category B and is considered safe during pregnancy. It is unknown if it is excreted in breast milk.
Azithromycin Counseling:  I discussed with the patient the risks of azithromycin including but not limited to GI upset, allergic reaction, drug rash, diarrhea, and yeast infections.
Detail Level: Zone
Azithromycin Pregnancy And Lactation Text: This medication is considered safe during pregnancy and is also secreted in breast milk.
Spironolactone Counseling: Patient advised regarding risks of diarrhea, abdominal pain, hyperkalemia, birth defects (for female patients), liver toxicity and renal toxicity. The patient may need blood work to monitor liver and kidney function and potassium levels while on therapy. The patient verbalized understanding of the proper use and possible adverse effects of spironolactone.  All of the patient's questions and concerns were addressed.

## 2021-07-21 ENCOUNTER — RECORDS - HEALTHEAST (OUTPATIENT)
Dept: ADMINISTRATIVE | Facility: CLINIC | Age: 56
End: 2021-07-21

## 2021-07-23 ENCOUNTER — RX ONLY (RX ONLY)
Age: 56
End: 2021-07-23

## 2021-07-23 RX ORDER — FLUCONAZOLE 150 MG/1
TABLET ORAL
Qty: 2 | Refills: 0 | Status: ERX | COMMUNITY
Start: 2021-07-23

## 2021-09-29 ENCOUNTER — APPOINTMENT (OUTPATIENT)
Age: 56
Setting detail: DERMATOLOGY
End: 2021-09-29

## 2021-09-29 DIAGNOSIS — L70.0 ACNE VULGARIS: ICD-10-CM

## 2021-09-29 PROCEDURE — 99214 OFFICE O/P EST MOD 30 MIN: CPT

## 2021-09-29 PROCEDURE — OTHER COUNSELING: OTHER

## 2021-09-29 PROCEDURE — OTHER FOLLOW UP FOR NEXT VISIT: OTHER

## 2021-09-29 PROCEDURE — OTHER PRESCRIPTION: OTHER

## 2021-09-29 PROCEDURE — OTHER PRESCRIPTION MEDICATION MANAGEMENT: OTHER

## 2021-09-29 RX ORDER — DOXYCYCLINE 100 MG/1
TABLET, FILM COATED ORAL
Qty: 30 | Refills: 2 | Status: ERX | COMMUNITY
Start: 2021-09-29

## 2021-09-29 RX ORDER — HYDROCORTISONE 25 MG/G
CREAM TOPICAL
Qty: 30 | Refills: 0 | Status: ERX | COMMUNITY
Start: 2021-09-29

## 2021-09-29 RX ORDER — CLINDAMYCIN PHOSPHATE 10 MG/ML
LOTION TOPICAL
Qty: 60 | Refills: 2 | Status: ERX | COMMUNITY
Start: 2021-09-29

## 2021-09-29 RX ORDER — FLUCONAZOLE 150 MG/1
TABLET ORAL
Qty: 2 | Refills: 3 | Status: ERX | COMMUNITY
Start: 2021-09-29

## 2021-09-29 ASSESSMENT — LOCATION DETAILED DESCRIPTION DERM
LOCATION DETAILED: LEFT CENTRAL BUCCAL CHEEK
LOCATION DETAILED: RIGHT SUPERIOR NASAL CHEEK

## 2021-09-29 ASSESSMENT — LOCATION ZONE DERM: LOCATION ZONE: FACE

## 2021-09-29 ASSESSMENT — LOCATION SIMPLE DESCRIPTION DERM
LOCATION SIMPLE: LEFT CHEEK
LOCATION SIMPLE: RIGHT CHEEK

## 2021-09-29 NOTE — PROCEDURE: COUNSELING
Minocycline Pregnancy And Lactation Text: This medication is Pregnancy Category D and not consider safe during pregnancy. It is also excreted in breast milk.
Topical Clindamycin Pregnancy And Lactation Text: This medication is Pregnancy Category B and is considered safe during pregnancy. It is unknown if it is excreted in breast milk.
Dapsone Pregnancy And Lactation Text: This medication is Pregnancy Category C and is not considered safe during pregnancy or breast feeding.
Azithromycin Pregnancy And Lactation Text: This medication is considered safe during pregnancy and is also secreted in breast milk.
Dapsone Counseling: I discussed with the patient the risks of dapsone including but not limited to hemolytic anemia, agranulocytosis, rashes, methemoglobinemia, kidney failure, peripheral neuropathy, headaches, GI upset, and liver toxicity.  Patients who start dapsone require monitoring including baseline LFTs and weekly CBCs for the first month, then every month thereafter.  The patient verbalized understanding of the proper use and possible adverse effects of dapsone.  All of the patient's questions and concerns were addressed.
High Dose Vitamin A Pregnancy And Lactation Text: High dose vitamin A therapy is contraindicated during pregnancy and breast feeding.
Topical Sulfur Applications Pregnancy And Lactation Text: This medication is Pregnancy Category C and has an unknown safety profile during pregnancy. It is unknown if this topical medication is excreted in breast milk.
Erythromycin Counseling:  I discussed with the patient the risks of erythromycin including but not limited to GI upset, allergic reaction, drug rash, diarrhea, increase in liver enzymes, and yeast infections.
Benzoyl Peroxide Pregnancy And Lactation Text: This medication is Pregnancy Category C. It is unknown if benzoyl peroxide is excreted in breast milk.
Use Enhanced Medication Counseling?: No
Doxycycline Pregnancy And Lactation Text: This medication is Pregnancy Category D and not consider safe during pregnancy. It is also excreted in breast milk but is considered safe for shorter treatment courses.
Isotretinoin Counseling: Patient should get monthly blood tests, not donate blood, not drive at night if vision affected, not share medication, and not undergo elective surgery for 6 months after tx completed. Side effects reviewed, pt to contact office should one occur.
Azithromycin Counseling:  I discussed with the patient the risks of azithromycin including but not limited to GI upset, allergic reaction, drug rash, diarrhea, and yeast infections.
Spironolactone Counseling: Patient advised regarding risks of diarrhea, abdominal pain, hyperkalemia, birth defects (for female patients), liver toxicity and renal toxicity. The patient may need blood work to monitor liver and kidney function and potassium levels while on therapy. The patient verbalized understanding of the proper use and possible adverse effects of spironolactone.  All of the patient's questions and concerns were addressed.
Spironolactone Pregnancy And Lactation Text: This medication can cause feminization of the male fetus and should be avoided during pregnancy. The active metabolite is also found in breast milk.
Topical Sulfur Applications Counseling: Topical Sulfur Counseling: Patient counseled that this medication may cause skin irritation or allergic reactions.  In the event of skin irritation, the patient was advised to reduce the amount of the drug applied or use it less frequently.   The patient verbalized understanding of the proper use and possible adverse effects of topical sulfur application.  All of the patient's questions and concerns were addressed.
High Dose Vitamin A Counseling: Side effects reviewed, pt to contact office should one occur.
Topical Retinoid counseling:  Patient advised to apply a pea-sized amount only at bedtime and wait 30 minutes after washing their face before applying.  If too drying, patient may add a non-comedogenic moisturizer. The patient verbalized understanding of the proper use and possible adverse effects of retinoids.  All of the patient's questions and concerns were addressed.
Minocycline Counseling: Patient advised regarding possible photosensitivity and discoloration of the teeth, skin, lips, tongue and gums.  Patient instructed to avoid sunlight, if possible.  When exposed to sunlight, patients should wear protective clothing, sunglasses, and sunscreen.  The patient was instructed to call the office immediately if the following severe adverse effects occur:  hearing changes, easy bruising/bleeding, severe headache, or vision changes.  The patient verbalized understanding of the proper use and possible adverse effects of minocycline.  All of the patient's questions and concerns were addressed.
Detail Level: Zone
Bactrim Counseling:  I discussed with the patient the risks of sulfa antibiotics including but not limited to GI upset, allergic reaction, drug rash, diarrhea, dizziness, photosensitivity, and yeast infections.  Rarely, more serious reactions can occur including but not limited to aplastic anemia, agranulocytosis, methemoglobinemia, blood dyscrasias, liver or kidney failure, lung infiltrates or desquamative/blistering drug rashes.
Topical Clindamycin Counseling: Patient counseled that this medication may cause skin irritation or allergic reactions.  In the event of skin irritation, the patient was advised to reduce the amount of the drug applied or use it less frequently.   The patient verbalized understanding of the proper use and possible adverse effects of clindamycin.  All of the patient's questions and concerns were addressed.
Tazorac Counseling:  Patient advised that medication is irritating and drying.  Patient may need to apply sparingly and wash off after an hour before eventually leaving it on overnight.  The patient verbalized understanding of the proper use and possible adverse effects of tazorac.  All of the patient's questions and concerns were addressed.
Tazorac Pregnancy And Lactation Text: This medication is not safe during pregnancy. It is unknown if this medication is excreted in breast milk.
Erythromycin Pregnancy And Lactation Text: This medication is Pregnancy Category B and is considered safe during pregnancy. It is also excreted in breast milk.
Isotretinoin Pregnancy And Lactation Text: This medication is Pregnancy Category X and is considered extremely dangerous during pregnancy. It is unknown if it is excreted in breast milk.
Sarecycline Counseling: Patient advised regarding possible photosensitivity and discoloration of the teeth, skin, lips, tongue and gums.  Patient instructed to avoid sunlight, if possible.  When exposed to sunlight, patients should wear protective clothing, sunglasses, and sunscreen.  The patient was instructed to call the office immediately if the following severe adverse effects occur:  hearing changes, easy bruising/bleeding, severe headache, or vision changes.  The patient verbalized understanding of the proper use and possible adverse effects of sarecycline.  All of the patient's questions and concerns were addressed.
Birth Control Pills Counseling: Birth Control Pill Counseling: I discussed with the patient the potential side effects of OCPs including but not limited to increased risk of stroke, heart attack, thrombophlebitis, deep venous thrombosis, hepatic adenomas, breast changes, GI upset, headaches, and depression.  The patient verbalized understanding of the proper use and possible adverse effects of OCPs. All of the patient's questions and concerns were addressed.
Tetracycline Counseling: Patient counseled regarding possible photosensitivity and increased risk for sunburn.  Patient instructed to avoid sunlight, if possible.  When exposed to sunlight, patients should wear protective clothing, sunglasses, and sunscreen.  The patient was instructed to call the office immediately if the following severe adverse effects occur:  hearing changes, easy bruising/bleeding, severe headache, or vision changes.  The patient verbalized understanding of the proper use and possible adverse effects of tetracycline.  All of the patient's questions and concerns were addressed. Patient understands to avoid pregnancy while on therapy due to potential birth defects.
Topical Retinoid Pregnancy And Lactation Text: This medication is Pregnancy Category C. It is unknown if this medication is excreted in breast milk.
Benzoyl Peroxide Counseling: Patient counseled that medicine may cause skin irritation and bleach clothing.  In the event of skin irritation, the patient was advised to reduce the amount of the drug applied or use it less frequently.   The patient verbalized understanding of the proper use and possible adverse effects of benzoyl peroxide.  All of the patient's questions and concerns were addressed.
Bactrim Pregnancy And Lactation Text: This medication is Pregnancy Category D and is known to cause fetal risk.  It is also excreted in breast milk.
Birth Control Pills Pregnancy And Lactation Text: This medication should be avoided if pregnant and for the first 30 days post-partum.
Doxycycline Counseling:  Patient counseled regarding possible photosensitivity and increased risk for sunburn.  Patient instructed to avoid sunlight, if possible.  When exposed to sunlight, patients should wear protective clothing, sunglasses, and sunscreen.  The patient was instructed to call the office immediately if the following severe adverse effects occur:  hearing changes, easy bruising/bleeding, severe headache, or vision changes.  The patient verbalized understanding of the proper use and possible adverse effects of doxycycline.  All of the patient's questions and concerns were addressed.

## 2021-09-29 NOTE — PROCEDURE: PRESCRIPTION MEDICATION MANAGEMENT
Render In Strict Bullet Format?: No
Detail Level: Zone
Plan: Take Diflucan if develops yeast infection\\n\\nFollow up in 3 months
Initiate Treatment: Hydrocortisone cream 2.5% apply to affected areas of the face twice daily up to 2 weeks\\n
Continue Regimen: Restart Doxycycline 100mg take one pill daily with meal \\nRestart Clindamycin lotion 0.1% apply to acne prone areas QAM
Modify Regimen: Over the counter CeraVe lotion as moisturizer

## 2022-03-02 ENCOUNTER — RX ONLY (RX ONLY)
Age: 57
End: 2022-03-02

## 2022-03-02 RX ORDER — DOXYCYCLINE 100 MG/1
CAPSULE ORAL
Qty: 30 | Refills: 0 | Status: ERX | COMMUNITY
Start: 2022-03-02